# Patient Record
(demographics unavailable — no encounter records)

---

## 2024-12-13 NOTE — HISTORY OF PRESENT ILLNESS
[de-identified] : 12/13/2024  :GABRIEL HUNTER , a87 year old female, presents today for left elbow, pt stated she fell on 10/10/24, sates pain hasn't gotten better, went to ER, had CT done, stated acute intra-articular fracture of lateral and medial epicondyles. ROM is okay, does have some discomfort with flexion.

## 2024-12-13 NOTE — DATA REVIEWED
[CT Scan] : CT scan [Left] : left [Elbow] : elbow [FreeTextEntry1] : I independently reviewed CT report

## 2024-12-13 NOTE — DISCUSSION/SUMMARY
[de-identified] : Will schedule the patient for a CT Scan of the AFFECTED AREA to assess the problem pending insurance authorization if necessary.  The patient has continued symptoms despite various treatment modalities.  The CT Scan will help to clarify the diagnosis and subsequent treatment plan. Left elbow, evaluate capitellar fracture.   The patient has one or more conditions that would benefit from physical therapy.  The physical therapy is requested to improve any deficit in pain, range of motion, strength and other problems in the affected body part(s) as noted in the physical examination above. Left elbow.   Follow up with Tramaine/Kleber after Ct scan.

## 2024-12-13 NOTE — PHYSICAL EXAM
[Left] : left elbow [4___] : extension 4[unfilled]/5 [] : no erythema [3___] : supination 3[unfilled]/5 [de-identified] : No tenderness on exam [FreeTextEntry9] : 15-09 [TWNoteComboBox7] : flexion 90 degrees [TWNoteComboBox4] : extension <0 degrees [TWNoteComboBox6] : pronation 80 degrees [de-identified] : supination 70 degrees

## 2024-12-13 NOTE — ASSESSMENT
[FreeTextEntry1] : Acute onset of left elbow pain after a trip and fall on 10/10/24.  CT scan 10/10/24 (report) with intra-articular fractures medial and lateral epicondyles with proximal displacement of capitellar fx fragment, tiny olecranon fx, nd radial head fx.   Capitellar fracture displaced and non aligned with proximal radius.  Pmhx - htn, high cholesterol, asthma Friend of [place].

## 2025-01-14 NOTE — PHYSICAL EXAM
[UE] : Sensory: Intact in bilateral upper extremities [Normal RUE] : Right Upper Extremity: No scars, rashes, lesions, ulcers, skin intact [Normal LUE] : Left Upper Extremity: No scars, rashes, lesions, ulcers, skin intact [Normal Touch] : sensation intact for touch [Normal] : Alert and in no acute distress [de-identified] : LEFT elbow No edema, ecchymoses, erythema or obvious deformity. Minimal tenderness distal ulna and elbow joint. Range of motion is -35 degrees extension and flexion to about 110 degrees.  75 degrees pronation and supination without pain. Intact biceps and triceps.  Motor and sensation are intact in the hand [de-identified] : X-rays ordered, performed and reviewed today of LEFT elbow AP and lateral views showed what appears to be a fully healed but displaced distal humerus fracture with approximately displaced capitellar fracture so that the capitellum is no longer facing the radial head.  Radial head appears relatively anatomic  EXAM 10/10/24:  CT ELBOW WITHOUT IV CONTRAST - LEFT CLINICAL HISTORY:  Elbow pain. TECHNIQUE:  CT of the left elbow was obtained without intravenous contrast. Sagittal and coronal reformats were created. COMPARISON:  No prior CT available.  Correlation with same day left elbow radiograph. FINDINGS:  Interval placement of a splint.  Acute comminuted intra-articular displaced fracture of the lateral and medial epicondyles with intra-articular extension.  Capitellar fracture fragment is displaced proximally.  Tiny ossific fragment adjacent to the olecranon.  Linear lucency traversing the radial head concerning for nondisplaced fracture.  Suspected fracture of the radial head on radiograph is not well appreciated, probably artifactual.  No dislocation.  Moderate elbow joint effusion.  No radiopaque foreign object. IMPRESSION: Acute intra-articular fractures of the lateral and medial epicondyles with proximal displacement of the capitellar fracture fragment. Tiny fracture involving the olecranon. Probable nondisplaced radial head fracture.  EXAM 10/10/24 LEFT ELBOW XRAY CLINICAL HISTORY:  fall TECHNIQUE:  AP, lateral, and oblique views of left elbow. COMPARISON:  None: FINDINGS:  There is in acute displaced intra-articular fracture of the lateral epicondyle.  There is a minimally depressed fracture of the radial head.  Joint effusion present.  No dislocation. IMPRESSION: Fractures of the distal humerus and radial head as above.

## 2025-01-14 NOTE — ASSESSMENT
[FreeTextEntry1] : 86 y/o had a displaced capitellar/distal humerus fracture that occurred over 3 months ago when she fell in the street. She had seen someone.  Surgery may have been mentioned but she preferred not to do surgery and had a hard time finding a doctor afterwards.  She never has a follow-up CT scan. It looks like the fracture has healed with malalignment.  The capitellum is displaced proximally and not facing the radial head. Despite that she has no significant pain at this time and has motion from about 35 to 110 degrees.  With the displaced fragment I am not sure she will get more flexion because it may impinge but she can try to work on motion and strengthening to optimize function.  It is her nondominant arm and she would prefer to avoid any surgery and would rather live with it.  As long as it is functional and not hurting it may be acceptable. I referred her to therapy but they should be gentle and not forced motion or cause any pain.  If it is causing more pain she should stop.  If she is having worsening pain I would also get a follow-up CT scan to better evaluate the fracture healing and alignment. Follow-up in about 4 to 6 weeks

## 2025-01-14 NOTE — HISTORY OF PRESENT ILLNESS
[de-identified] : 87 year old RHD female comes in today for LEFT elbow injury. She fell on 10/10/24. She was walking across the street and fell on her LEFT side. She went to ER, had CT and x-rays done showing distal humerus and radaial head fracture. She was given a splint. She had limited follow up afterward. She then saw Dr. Louise last month who referred her for a new CT and to start physical therapy. She did not do the CT or start PT.  Overall, her pain is very minimal unless to stresses it too much. She cannot fully extend elbow and flexion is even more limited. She has difficulty reaching her face making tasks like putting make up or lotion on or brushing hair. She does not take any pain medications or do any treatment like heat or topicals. She would strongly prefer not to do any surgery

## 2025-01-14 NOTE — PHYSICAL EXAM
[UE] : Sensory: Intact in bilateral upper extremities [Normal RUE] : Right Upper Extremity: No scars, rashes, lesions, ulcers, skin intact [Normal LUE] : Left Upper Extremity: No scars, rashes, lesions, ulcers, skin intact [Normal Touch] : sensation intact for touch [Normal] : Alert and in no acute distress [de-identified] : LEFT elbow No edema, ecchymoses, erythema or obvious deformity. Minimal tenderness distal ulna and elbow joint. Range of motion is -35 degrees extension and flexion to about 110 degrees.  75 degrees pronation and supination without pain. Intact biceps and triceps.  Motor and sensation are intact in the hand [de-identified] : X-rays ordered, performed and reviewed today of LEFT elbow AP and lateral views showed what appears to be a fully healed but displaced distal humerus fracture with approximately displaced capitellar fracture so that the capitellum is no longer facing the radial head.  Radial head appears relatively anatomic  EXAM 10/10/24:  CT ELBOW WITHOUT IV CONTRAST - LEFT CLINICAL HISTORY:  Elbow pain. TECHNIQUE:  CT of the left elbow was obtained without intravenous contrast. Sagittal and coronal reformats were created. COMPARISON:  No prior CT available.  Correlation with same day left elbow radiograph. FINDINGS:  Interval placement of a splint.  Acute comminuted intra-articular displaced fracture of the lateral and medial epicondyles with intra-articular extension.  Capitellar fracture fragment is displaced proximally.  Tiny ossific fragment adjacent to the olecranon.  Linear lucency traversing the radial head concerning for nondisplaced fracture.  Suspected fracture of the radial head on radiograph is not well appreciated, probably artifactual.  No dislocation.  Moderate elbow joint effusion.  No radiopaque foreign object. IMPRESSION: Acute intra-articular fractures of the lateral and medial epicondyles with proximal displacement of the capitellar fracture fragment. Tiny fracture involving the olecranon. Probable nondisplaced radial head fracture.  EXAM 10/10/24 LEFT ELBOW XRAY CLINICAL HISTORY:  fall TECHNIQUE:  AP, lateral, and oblique views of left elbow. COMPARISON:  None: FINDINGS:  There is in acute displaced intra-articular fracture of the lateral epicondyle.  There is a minimally depressed fracture of the radial head.  Joint effusion present.  No dislocation. IMPRESSION: Fractures of the distal humerus and radial head as above.

## 2025-01-14 NOTE — HISTORY OF PRESENT ILLNESS
[de-identified] : 87 year old RHD female comes in today for LEFT elbow injury. She fell on 10/10/24. She was walking across the street and fell on her LEFT side. She went to ER, had CT and x-rays done showing distal humerus and radaial head fracture. She was given a splint. She had limited follow up afterward. She then saw Dr. Louise last month who referred her for a new CT and to start physical therapy. She did not do the CT or start PT.  Overall, her pain is very minimal unless to stresses it too much. She cannot fully extend elbow and flexion is even more limited. She has difficulty reaching her face making tasks like putting make up or lotion on or brushing hair. She does not take any pain medications or do any treatment like heat or topicals. She would strongly prefer not to do any surgery

## 2025-02-14 NOTE — HISTORY OF PRESENT ILLNESS
[de-identified] : 87 year old RHD female comes in today for LEFT elbow injury. She fell on 10/10/24. She was walking across the street and fell on her LEFT side. She comes in today feeling about the same. She is still very stiff in both flexion and extension. She was only able to start physical therapy a couple weeks ago.  So far she has not seen much difference.  There is gentle and not doing anything to aggravate it. She has minimal pain unless she moves it wrong.  She does not take any  Pain medications.  She feels like this is something she can live with and really does not want to do surgery.

## 2025-02-14 NOTE — ASSESSMENT
[FreeTextEntry1] : 86 y/o had a displaced capitellar/distal humerus fracture that occurred over 4 months ago when she fell in the street.  She had displaced fracture of the capitellum that has healed with a malunion.  There is no significant pain right now so it seems to be stable.  She will has somewhat functional motion and given the lack of pain she really does not want to consider surgical treatment. I do not feel she will get much more flexion back because I think the capitellar displaced fragments will block flexion.  She may get a little more extension.  She could try more therapy to work on ADLs and function to try to get a little more motion but they should not push too hard to cause any pain.  If she does get pain she should back off or just stop the therapy and do things on her own.  She will give it a few more months and see if she is satisfied with the outcome.  If she starts to get more pain or is not satisfied then she can follow-up or call me so we can discuss the next step.

## 2025-02-14 NOTE — PHYSICAL EXAM
[UE] : Sensory: Intact in bilateral upper extremities [Normal RUE] : Right Upper Extremity: No scars, rashes, lesions, ulcers, skin intact [Normal LUE] : Left Upper Extremity: No scars, rashes, lesions, ulcers, skin intact [Normal Touch] : sensation intact for touch [Normal] : Alert and in no acute distress [de-identified] : LEFT elbow No edema, ecchymoses, erythema or obvious deformity. No significant tenderness Range of motion is -35 degrees extension and flexion to about 110-115 degrees with discomfort at extremes of motion.  80 degrees pronation and supination without pain and no significant crepitus. She can reach her mouth and face.  It is harder for her to reach her ear on the left side Intact biceps and triceps.  Motor and sensation are intact in the hand [de-identified] : X-rays today to compare to 1/14/25 of LEFT elbow AP and lateral views showed what appears to be a fully healed but displaced distal humerus fracture with Proximally displaced capitellar fracture so that the capitellum is no longer facing the radial head.  Radial head appears relatively anatomic  EXAM 10/10/24:  CT ELBOW WITHOUT IV CONTRAST - LEFT CLINICAL HISTORY:  Elbow pain. TECHNIQUE:  CT of the left elbow was obtained without intravenous contrast. Sagittal and coronal reformats were created. COMPARISON:  No prior CT available.  Correlation with same day left elbow radiograph. FINDINGS:  Interval placement of a splint.  Acute comminuted intra-articular displaced fracture of the lateral and medial epicondyles with intra-articular extension.  Capitellar fracture fragment is displaced proximally.  Tiny ossific fragment adjacent to the olecranon.  Linear lucency traversing the radial head concerning for nondisplaced fracture.  Suspected fracture of the radial head on radiograph is not well appreciated, probably artifactual.  No dislocation.  Moderate elbow joint effusion.  No radiopaque foreign object. IMPRESSION: Acute intra-articular fractures of the lateral and medial epicondyles with proximal displacement of the capitellar fracture fragment. Tiny fracture involving the olecranon. Probable nondisplaced radial head fracture.  EXAM 10/10/24 LEFT ELBOW XRAY CLINICAL HISTORY:  fall TECHNIQUE:  AP, lateral, and oblique views of left elbow. COMPARISON:  None: FINDINGS:  There is in acute displaced intra-articular fracture of the lateral epicondyle.  There is a minimally depressed fracture of the radial head.  Joint effusion present.  No dislocation. IMPRESSION: Fractures of the distal humerus and radial head as above.

## 2025-02-14 NOTE — PHYSICAL EXAM
[UE] : Sensory: Intact in bilateral upper extremities [Normal RUE] : Right Upper Extremity: No scars, rashes, lesions, ulcers, skin intact [Normal LUE] : Left Upper Extremity: No scars, rashes, lesions, ulcers, skin intact [Normal Touch] : sensation intact for touch [Normal] : Alert and in no acute distress [de-identified] : LEFT elbow No edema, ecchymoses, erythema or obvious deformity. No significant tenderness Range of motion is -35 degrees extension and flexion to about 110-115 degrees with discomfort at extremes of motion.  80 degrees pronation and supination without pain and no significant crepitus. She can reach her mouth and face.  It is harder for her to reach her ear on the left side Intact biceps and triceps.  Motor and sensation are intact in the hand [de-identified] : X-rays today to compare to 1/14/25 of LEFT elbow AP and lateral views showed what appears to be a fully healed but displaced distal humerus fracture with Proximally displaced capitellar fracture so that the capitellum is no longer facing the radial head.  Radial head appears relatively anatomic  EXAM 10/10/24:  CT ELBOW WITHOUT IV CONTRAST - LEFT CLINICAL HISTORY:  Elbow pain. TECHNIQUE:  CT of the left elbow was obtained without intravenous contrast. Sagittal and coronal reformats were created. COMPARISON:  No prior CT available.  Correlation with same day left elbow radiograph. FINDINGS:  Interval placement of a splint.  Acute comminuted intra-articular displaced fracture of the lateral and medial epicondyles with intra-articular extension.  Capitellar fracture fragment is displaced proximally.  Tiny ossific fragment adjacent to the olecranon.  Linear lucency traversing the radial head concerning for nondisplaced fracture.  Suspected fracture of the radial head on radiograph is not well appreciated, probably artifactual.  No dislocation.  Moderate elbow joint effusion.  No radiopaque foreign object. IMPRESSION: Acute intra-articular fractures of the lateral and medial epicondyles with proximal displacement of the capitellar fracture fragment. Tiny fracture involving the olecranon. Probable nondisplaced radial head fracture.  EXAM 10/10/24 LEFT ELBOW XRAY CLINICAL HISTORY:  fall TECHNIQUE:  AP, lateral, and oblique views of left elbow. COMPARISON:  None: FINDINGS:  There is in acute displaced intra-articular fracture of the lateral epicondyle.  There is a minimally depressed fracture of the radial head.  Joint effusion present.  No dislocation. IMPRESSION: Fractures of the distal humerus and radial head as above.

## 2025-02-14 NOTE — HISTORY OF PRESENT ILLNESS
[de-identified] : 87 year old RHD female comes in today for LEFT elbow injury. She fell on 10/10/24. She was walking across the street and fell on her LEFT side. She comes in today feeling about the same. She is still very stiff in both flexion and extension. She was only able to start physical therapy a couple weeks ago.  So far she has not seen much difference.  There is gentle and not doing anything to aggravate it. She has minimal pain unless she moves it wrong.  She does not take any  Pain medications.  She feels like this is something she can live with and really does not want to do surgery.

## 2025-02-14 NOTE — ASSESSMENT
[FreeTextEntry1] : 88 y/o had a displaced capitellar/distal humerus fracture that occurred over 4 months ago when she fell in the street.  She had displaced fracture of the capitellum that has healed with a malunion.  There is no significant pain right now so it seems to be stable.  She will has somewhat functional motion and given the lack of pain she really does not want to consider surgical treatment. I do not feel she will get much more flexion back because I think the capitellar displaced fragments will block flexion.  She may get a little more extension.  She could try more therapy to work on ADLs and function to try to get a little more motion but they should not push too hard to cause any pain.  If she does get pain she should back off or just stop the therapy and do things on her own.  She will give it a few more months and see if she is satisfied with the outcome.  If she starts to get more pain or is not satisfied then she can follow-up or call me so we can discuss the next step.